# Patient Record
Sex: MALE | Race: WHITE
[De-identification: names, ages, dates, MRNs, and addresses within clinical notes are randomized per-mention and may not be internally consistent; named-entity substitution may affect disease eponyms.]

---

## 2022-07-27 ENCOUNTER — HOSPITAL ENCOUNTER (OUTPATIENT)
Dept: HOSPITAL 76 - SC | Age: 27
Discharge: HOME | End: 2022-07-27
Attending: NURSE PRACTITIONER
Payer: COMMERCIAL

## 2022-07-27 VITALS — SYSTOLIC BLOOD PRESSURE: 117 MMHG | DIASTOLIC BLOOD PRESSURE: 84 MMHG

## 2022-07-27 DIAGNOSIS — G47.10: ICD-10-CM

## 2022-07-27 DIAGNOSIS — F32.A: ICD-10-CM

## 2022-07-27 DIAGNOSIS — G47.8: ICD-10-CM

## 2022-07-27 DIAGNOSIS — R53.83: ICD-10-CM

## 2022-07-27 DIAGNOSIS — E66.9: ICD-10-CM

## 2022-07-27 DIAGNOSIS — R06.83: Primary | ICD-10-CM

## 2022-07-27 PROCEDURE — 99212 OFFICE O/P EST SF 10 MIN: CPT

## 2022-07-27 PROCEDURE — 99203 OFFICE O/P NEW LOW 30 MIN: CPT

## 2022-07-27 NOTE — SLEEP CARE CONSULTATION
Information from patient questionnaire entered by Narayan Alston MA.





I have reviewed and concur with the information entered by Narayan Alston MA. 

This document represents the service I personally performed and the decisions 

made by me, Pao Calle ARNP.





History of Present Illness


Service Date and Time: 07/27/2022    1529


Reason for Visit: New patient (ONSET 1/1/2017, NO PRIORS, )


Chief Complaint: reports: Unrefreshed sleep, Snoring, Excessive daytime sleep

iness, Fatigue


Date of Onset: 5 PLUS YEARS


Usual bedtime: 11 -12 PM


Time it takes to fall asleep: 15 - 30 MINUTES


Snores at night: Yes


Observed to quit breathing while asleep: No


Sleeps alone due to snoring: Yes


Number of times waking at night: 1 -3 


Reasons for waking at night: reports: Snoring, Other (unknown reasons).  denies:

Choking, Gasping for air


Toss, Turn, or Twitch while sleeping: Yes


Recalls having dreams: Yes


Usually gets out of bed at: [id number]; weekends 0900


Feels refreshed in the morning: No


Morning headache: No


Sleepy or fatigued during the day: Yes


Ever fallen asleep while driving: No


Takes day naps: No


Prior sleep studies: No


Additional HPI information: 





I had the pleasure of seeing ZACH ORDAZ today regarding the possibility of him 

having a sleep disorder. His current complaints are Unrefreshed sleep, Snoring, 

Excessive daytime sleepiness and Fatigue. He has general drowsiness that extends

throughout the day. He will toss and turn in his sleep. His wife has told him he

snores loudly. He has asked his wife and she does not remember seeing any pauses

in breathing. She can still sleep in same room. He does not wake up feeling 

rested and is tired through the day. He does not take naps. He has a history of 

ADHD/inattentive and has problems with concentration. He has been on Adderall in

the past but he has not taken it for about 2 weeks and is going to talk to his 

provider about stopping it altogether. He also has a history of depression and 

taking Wellbutrin.  








- Parasomnia Symptoms


Ever been unable to move upon waking from sleep: No


Walks in sleep: No


Talks in sleep: Yes


Ever acted out dreams in sleep: No


Ever felt weak in the knees when startled or emotional: Yes (has not fallen to 

ground)


Bothered by creepy, crawly, restless sensations in legs: No


Problems with memory or concentration: Yes (both; has ADHD/inattentive)





Subjective


Initial [place] Sleepiness Scale score: 11 (7/27/2022)





Past Medical History


Past Medical History: reports: Anxiety, Depression, Attention deficit





Social History


The patient's occupation is a [employer]. Patient is  and lives in . 





Have you smoked in the past 12 months: No


Alcohol use: Yes


Alcohol amount and frequency: 3-4 X MONTHLY


Caffeine use: Yes


Caffeine amount and frequency: 1-2 X WEEKLY





Family History


Family history of sleep disordered breathing: Yes


Family Hx Sleep Apnea: Mother: Snoring, Sleep apnea - Untreated





Allergies and Home Medications


Known drug allergies: No


Drug allergies reviewed: Yes (NKDA)


Home medication list reviewed: Yes


Allergy and home medication list: 





Medications:


Wellbutrin 300 mg


Adderall 20 mg (has not taken for 2 weeks)


Vitamin D


Multivitamin





Review of Systems


Weight gain over past 5 years: 65 lbs


Respiratory: reports: shortness of breath


Neurological: reports: headaches.  denies: head trauma


Psychiatric: reports: Attention Deficit Hyperactivity, anxiety, depression


Ear/Nose/Throat: reports: wisdom teeth removed.  denies: dry mouth/throat, ton

sillectomy


Endocrine: reports: sluggishness


Immunologic: denies: allergies to food or environment





Physical Exam


Vital signs obtained and entered by: SEVERINO ALSTON CMA AAMA


Blood Pressure: 117/84 (RESP 18, PULSE 90, RIGHT)


Cuff size: wrist


Heart Rate: 85


O2 Saturation: 98 (PAPER MASK)


Height: 5 ft 10 in


Weight: 240 lb (UNIFORM AND BOOTS)


Body Mass Index: 34.4


BMI Classification: Obese


Neck circumference: 16 (INCHES)


Mouth and throat: narrow oropharynx


Soft palate: long


Hard palate: normal


Uvula: normal


Uvula visualization: 25% Mallampati Class III


Tongue: enlarged in size with teeth marks on lateral edges


Tonsils: small


Neck: normal w/o lymphadenopathy or thyromegaly


Heart: regular rate and rhythm


Lungs: clear bilaterally





Impression and Plan





1. Suspected Obstructive Sleep Apnea-Hypopnea Syndrome, as suggested by a 

history of loud and irregular snoring, unrefreshed sleep, cognitive impairment, 

and excessive daytime sleepiness. Narrow oropharynx and obesity are common 

predisposing factors for obstructive sleep apnea-hypopnea syndrome. I recommend 

proceeding to polysomnography to confirm the diagnosis and to assess severity. I

informed the patient of what the sleep studies involve and after some 

discussion, obtained agreement to proceed. The pathophysiology of obstructive 

sleep apnea-hypopnea syndrome was discussed with the patient and health risks of

cardiovascular and cerebrovascular disease if not treated.  Risks of drowsy 

driving discussed in detail and patient advised to avoid long distance driving 

and to pull over at the first sign of drowsiness. Patient agreed to plan. 





* Schedule polysomnography 


* Avoid long distance driving or driving when feeling sleepy.


* Avoid alcohol, sedative and muscle relaxant around bedtime.


* Attempt to lose weight.


* Review instructions provided by trained office staff on how to prepare for the

  sleep study.


* Return for follow-up after sleep study completed.








Counseling Topics: Weight loss health impact


Visit Type: In Office


Time Spent with Patient (minutes): 30


Provider Statement: I spent 100% of the Face to Face Visit with the patient with

greater than 50% spent counseling the patient and coordination of care.

## 2022-08-31 ENCOUNTER — HOSPITAL ENCOUNTER (OUTPATIENT)
Dept: HOSPITAL 76 - SC | Age: 27
Discharge: HOME | End: 2022-08-31
Attending: NURSE PRACTITIONER
Payer: COMMERCIAL

## 2022-08-31 VITALS — SYSTOLIC BLOOD PRESSURE: 120 MMHG | DIASTOLIC BLOOD PRESSURE: 76 MMHG

## 2022-08-31 DIAGNOSIS — G47.33: Primary | ICD-10-CM

## 2022-08-31 DIAGNOSIS — E66.9: ICD-10-CM

## 2022-08-31 PROCEDURE — 99213 OFFICE O/P EST LOW 20 MIN: CPT

## 2022-08-31 PROCEDURE — 99212 OFFICE O/P EST SF 10 MIN: CPT

## 2022-08-31 NOTE — SLEEP CARE CONSULTATION
Information from patient questionnaire entered by Narayan Alston MA.





I have reviewed and concur with the information entered by Narayan Alston MA. 

This document represents the service I personally performed and the decisions 

made by me, Pao Calle ARNP.





History of Present Illness


Service Date and Time: 08/31/2022    0922


Initial [place] Sleepiness Scale score: 11 (7/27/2022)


Current [place] Sleepiness Scale score: 12 (8/31/2022)


Additional HPI information: 





ZACH ORDAZ returns for follow up and results of the recently performed home 

sleep study.





I explained the pathophysiology behind obstructive sleep apnea. We then spent 

quite a bit of time discussing different treatment options.  For mild 

obstructive sleep apnea, surgery and oral appliance are alternatives to nasal 

CPAP therapy but in moderate or severe cases, nasal CPAP is the most effective 

and reliable treatment. I reviewed the impact of weight changes on sleep apnea 

and strongly recommended losing weight. After some discussion, the patient opted

to go with the nasal CPAP therapy.  Nasal autoCPAP set at 4-15 cmH20 will be 

ordered with rationale explained. A manual titration study will be ordered if 

unable to find optimal pressure with office adjustments. 





I explained how CPAP machine works and what to expect when using the machine.  

Using CPAP every night in order to get used to it was emphasized.  Patient 

advised to put CPAP mask on before getting into bed so as not to fall asleep 

without CPAP.  To assist acclimation to CPAP use, it could also be used for a 

short time during day while reading or watching TV. The patient was instructed 

to call the CPAP supplier to discuss any mechanical problem that may occur. If 

the mask given is uncomfortable or is difficult to keep on through the night 

even with adjustment, contact the CPAP supplier as many will replace with 

another mask style if notified before  30 days.  If snoring or perceives is not 

getting enough air or too much air from the machine, notify this office.  

Patient counseled not drink alcohol less than 4 hours before bedtime as it can 

increase snoring and apnea. Patient was cautioned about risks of drowsy driving 

until sleepiness symptoms resolve. Patient denies drowsy driving. 





Sleep Study





- Results


Type of Sleep Study: Home sleep study (F/U HST,  8/16/2022 [facility],)


Prior sleep studies: No


Polysomnography/Home Sleep Study results: 





Physician Impression:


The quality of the study is good. The length of the study is adequate (> 240 

minutes). Please also see the


tabulated and graphic data.


1. Obstructive Sleep Apnea-Hypopnea (ICD-10 G47.33), mild, with an AHI of 

11.3/hr and noreen SaO2 of


86%. During the study, the patient had 45 apneas (45 obstructive, 0 central, 0 

mixed) and 21


hypopneas. The longest episode lasted 70.0 seconds. The patient did not sleep 

supine during this study.


2. Hypoxemia (ICD-10 R09.02), minimal, with the lowest oxygen saturation of 86 %

and 0.8 minutes with


SaO2 under 90%. Baseline oxygen saturation was normal (Average oxygen saturation

was 95%). 





Allergies and Home Medications


Known drug allergies: No (NKA)


Drug allergies reviewed: Yes


Home medication list reviewed: Yes (no changes)


Allergy and home medication list: 


NKA 





VERIFIED BY NATALEE PEREZ 8/31/2022 0940





Review of Systems


Review of systems same as previous: Yes (no changes)





Physical Exam


Vital signs obtained and entered by: NATALEE PEREZ


Blood Pressure: 120/76 (R20, P94, RIGHT)


Cuff size: wrist


Heart Rate: 94


O2 Saturation: 97 (PAPER MASK)


Height: 5 ft 10 in


Weight: 245 lb (CLOTHES)


Body Mass Index: 35.2


BMI Classification: Obese





Impression and Plan





1. Obstructive Sleep Apnea-Hypopnea Syndrome, mild, with lowest oxygen 

saturation of 86%. Obviously this is the cause of the patients symptoms of 

unrefreshed sleep, and excessive daytime sleepiness. Positive pressure therapy 

could benefit anxiety, depression and attention deficit. As mentioned above, the

patient will be started on nasal autoCPAP therapy with pressure set at 4-15 cmH2

O. Compliance guidelines also reviewed. A copy of compliance guidelines will be 

given for reference at check out.  Patient did not sleep supine during the 

study.  I advised him to sleep with head elevated to help reduce apneas until he

gets the CPAP.  He is to call once he has the CPAP to set up his follow-up 

appointment.





* Nasal auto CPAP therapy, pressure at 4-15 cm H2O.


* Attempt to lose weight.


* Avoid alcohol consumption near bedtime.


* Avoid supine sleep until using CPAP.


* The patient is again cautioned about driving until sleepiness completely 

  resolves.


* Return one month after CPAP obtained. I will assess response to therapy and 

  compliance at that time.





Counseling Topics: Weight loss health impact


Visit Type: In Office


Time Spent with Patient (minutes): 21


Provider Statement: I spent 100% of the Face to Face Visit with the patient with

greater than 50% spent counseling the patient and coordination of care.